# Patient Record
Sex: FEMALE | NOT HISPANIC OR LATINO | URBAN - METROPOLITAN AREA
[De-identification: names, ages, dates, MRNs, and addresses within clinical notes are randomized per-mention and may not be internally consistent; named-entity substitution may affect disease eponyms.]

---

## 2018-01-15 ENCOUNTER — EMERGENCY (EMERGENCY)
Facility: HOSPITAL | Age: 28
LOS: 1 days | Discharge: HOME | End: 2018-01-15
Attending: EMERGENCY MEDICINE | Admitting: EMERGENCY MEDICINE
Payer: SELF-PAY

## 2018-01-15 VITALS
TEMPERATURE: 98 F | RESPIRATION RATE: 16 BRPM | DIASTOLIC BLOOD PRESSURE: 76 MMHG | HEART RATE: 75 BPM | OXYGEN SATURATION: 100 % | SYSTOLIC BLOOD PRESSURE: 124 MMHG

## 2018-01-15 DIAGNOSIS — Z90.13 ACQUIRED ABSENCE OF BILATERAL BREASTS AND NIPPLES: Chronic | ICD-10-CM

## 2018-01-15 PROCEDURE — 99283 EMERGENCY DEPT VISIT LOW MDM: CPT

## 2018-01-15 RX ORDER — ACETAMINOPHEN WITH CODEINE 300MG-30MG
1 TABLET ORAL
Qty: 10 | Refills: 0 | OUTPATIENT
Start: 2018-01-15

## 2018-01-15 NOTE — ED ADULT TRIAGE NOTE - CHIEF COMPLAINT QUOTE
Patient states she is here on vacation from [facility] states she has hx of breast cancer with bilateral lumpectomy pt complains of bilateral breast pain no discharge from her breast pt states she has fever pt is afebrile at this time .

## 2018-01-15 NOTE — ED PROVIDER NOTE - MEDICAL DECISION MAKING DETAILS
Pt is a 28 y/o F who presents to the ED with b/l breast pain w/ Hx of breast cancer x14 years. Pt is a 26 y/o F who presents to the ED with b/l breast pain w/ possible Hx of breast cancer. No e/o masses or discharge or skin changes. Will d/c to f/u w/ Surg-Onc Dr. Shivam Shi.

## 2018-01-15 NOTE — ED PROVIDER NOTE - CARE PLAN
Principal Discharge DX:	Breast pain in female  Instructions for follow-up, activity and diet:	Follow up with breast clinic. Return if discharge from breast or if pain not controlled with oral medications. Principal Discharge DX:	Breast pain in female  Instructions for follow-up, activity and diet:	Follow up with Dr. Shivam Shi [phone], [address] [place] [id number]. Bring your records from [facility] with you. Return if discharge from breast or if pain not controlled with oral medications.

## 2018-01-15 NOTE — ED PROVIDER NOTE - NS_ ATTENDINGSCRIBEDETAILS _ED_A_ED_FT
I saw and evaluated the patient myself. The scribe entered the note based on my observations and dictation. I have reviewed the scribe's note.

## 2018-01-15 NOTE — ED PROVIDER NOTE - OBJECTIVE STATEMENT
Pt is a 28 y/o F w/ breast cancer who presents to the ED with b/l breast pain and subjective fever. She had her first case of breast cancer/mass at 14 y/o, and has had 8 lumpectomies since. The only FHx of cancer is her sister with a brain tumor. She has been treated in [facility], where she lives, and is presently in the [place] on vacation. Pt is also allergic to ibuprofen which causes HA and drop in BP. Per pt, a scar was also found in her lungs, but she does not smoke. Denies nipple discharge. Pt is a 28 y/o F who states a history of breast cancer (no family h/o same) who presents to the ED with b/l breast pain and subjective fever. She had her first case of breast mass at 14 y/o (? cyst), and has had 8 lumpectomies since. The only FHx of cancer is her sister with a brain tumor. She has been treated in [facility], where she lives, and is presently in the  on vacation, but has been here 3 months and plans to stay another 4 months. Pt is also allergic to ibuprofen which causes HA. Per pt, a scar was also found in her lungs, but she does not smoke. Denies nipple discharge. No HA/N/V/D/UTI Sx/cough/sore throat.    Bowen: Patient mentioned the words "cyst" and "fibro-adenoma." She said her doctors in [facility] told her she has breast cancer, and that that she is at risk for breast cancer. States she's had chemotherapy (that caused her hair to fall out), but no radiation.

## 2018-01-15 NOTE — ED PROVIDER NOTE - PROGRESS NOTE DETAILS
Bowen: E-prescribing system would not allow electronic submission, so I printed the prescription and wrote that on the prescription. Bowen: HCG neg.

## 2018-01-23 PROBLEM — Z00.00 ENCOUNTER FOR PREVENTIVE HEALTH EXAMINATION: Status: ACTIVE | Noted: 2018-01-23

## 2018-01-30 ENCOUNTER — APPOINTMENT (OUTPATIENT)
Dept: SURGICAL ONCOLOGY | Facility: CLINIC | Age: 28
End: 2018-01-30
Payer: MEDICAID

## 2018-01-30 VITALS
WEIGHT: 69 LBS | BODY MASS INDEX: 14.68 KG/M2 | HEIGHT: 57.48 IN | SYSTOLIC BLOOD PRESSURE: 113 MMHG | DIASTOLIC BLOOD PRESSURE: 72 MMHG | HEART RATE: 70 BPM | OXYGEN SATURATION: 100 %

## 2018-01-30 DIAGNOSIS — N60.19 DIFFUSE CYSTIC MASTOPATHY OF UNSPECIFIED BREAST: ICD-10-CM

## 2018-01-30 DIAGNOSIS — Z78.9 OTHER SPECIFIED HEALTH STATUS: ICD-10-CM

## 2018-01-30 DIAGNOSIS — Z86.69 PERSONAL HISTORY OF OTHER DISEASES OF THE NERVOUS SYSTEM AND SENSE ORGANS: ICD-10-CM

## 2018-01-30 DIAGNOSIS — Z87.2 PERSONAL HISTORY OF DISEASES OF THE SKIN AND SUBCUTANEOUS TISSUE: ICD-10-CM

## 2018-01-30 PROCEDURE — 99204 OFFICE O/P NEW MOD 45 MIN: CPT

## 2018-01-30 RX ORDER — IBUPROFEN 200 MG/1
TABLET, COATED ORAL
Refills: 0 | Status: ACTIVE | COMMUNITY

## 2018-01-30 RX ORDER — OXYCODONE HYDROCHLORIDE AND ACETAMINOPHEN 10; 325 MG/1; MG/1
TABLET ORAL
Refills: 0 | Status: ACTIVE | COMMUNITY

## 2018-01-30 RX ORDER — ACETAMINOPHEN 325 MG/1
TABLET, FILM COATED ORAL
Refills: 0 | Status: ACTIVE | COMMUNITY

## 2018-02-01 ENCOUNTER — FORM ENCOUNTER (OUTPATIENT)
Age: 28
End: 2018-02-01

## 2018-02-02 ENCOUNTER — APPOINTMENT (OUTPATIENT)
Dept: ULTRASOUND IMAGING | Facility: IMAGING CENTER | Age: 28
End: 2018-02-02
Payer: MEDICAID

## 2018-02-02 ENCOUNTER — OUTPATIENT (OUTPATIENT)
Dept: OUTPATIENT SERVICES | Facility: HOSPITAL | Age: 28
LOS: 1 days | End: 2018-02-02
Payer: MEDICAID

## 2018-02-02 DIAGNOSIS — N60.19 DIFFUSE CYSTIC MASTOPATHY OF UNSPECIFIED BREAST: ICD-10-CM

## 2018-02-02 DIAGNOSIS — N64.4 MASTODYNIA: ICD-10-CM

## 2018-02-02 DIAGNOSIS — Z90.13 ACQUIRED ABSENCE OF BILATERAL BREASTS AND NIPPLES: Chronic | ICD-10-CM

## 2018-02-02 DIAGNOSIS — Z00.00 ENCOUNTER FOR GENERAL ADULT MEDICAL EXAMINATION WITHOUT ABNORMAL FINDINGS: ICD-10-CM

## 2018-02-02 PROCEDURE — 76641 ULTRASOUND BREAST COMPLETE: CPT

## 2018-02-02 PROCEDURE — 76641 ULTRASOUND BREAST COMPLETE: CPT | Mod: 26,50

## 2018-02-05 ENCOUNTER — TRANSCRIPTION ENCOUNTER (OUTPATIENT)
Age: 28
End: 2018-02-05

## 2018-02-05 NOTE — ED PROVIDER NOTE - PSYCHIATRIC, MLM
Alert and oriented to person, place, time/situation. normal mood and affect. no apparent risk to self or others. No

## 2018-02-06 ENCOUNTER — APPOINTMENT (OUTPATIENT)
Dept: SURGICAL ONCOLOGY | Facility: CLINIC | Age: 28
End: 2018-02-06
Payer: MEDICAID

## 2018-02-06 VITALS
HEIGHT: 57 IN | BODY MASS INDEX: 14.89 KG/M2 | DIASTOLIC BLOOD PRESSURE: 76 MMHG | OXYGEN SATURATION: 100 % | SYSTOLIC BLOOD PRESSURE: 123 MMHG | HEART RATE: 79 BPM | WEIGHT: 69 LBS

## 2018-02-06 DIAGNOSIS — N64.4 MASTODYNIA: ICD-10-CM

## 2018-02-06 DIAGNOSIS — N63.0 UNSPECIFIED LUMP IN UNSPECIFIED BREAST: ICD-10-CM

## 2018-02-06 PROCEDURE — 99214 OFFICE O/P EST MOD 30 MIN: CPT

## 2022-01-17 NOTE — ED PROVIDER NOTE - PRINCIPAL DIAGNOSIS
PATIENT CALLED IN REQUESTING AN ORDER FOR A COVID TEST BE SENT TO [facility]    PATIENT STATES HER SON TESTED POSITIVE AND SHE HAS BEEN FOR ABOUT 10 DAYS WITH BODY ACHES DIARRHEA AND COUGH AND STILL HAS THE HEAD CONGESTION      PATIENT STATES SHE NEEDS TO BE SENT BEFORE SHE STARTS  BABY SITTING FOR HER GRANDCHILDREN AGAIN     PLEASE CALL ONCE SENT   [phone]   Breast pain in female